# Patient Record
Sex: MALE | Race: WHITE | URBAN - METROPOLITAN AREA
[De-identification: names, ages, dates, MRNs, and addresses within clinical notes are randomized per-mention and may not be internally consistent; named-entity substitution may affect disease eponyms.]

---

## 2024-10-28 DIAGNOSIS — L40.0 PSORIASIS VULGARIS: Primary | ICD-10-CM

## 2024-10-28 RX ORDER — ROFLUMILAST 3 MG/G
1 CREAM TOPICAL DAILY
Qty: 60 G | Refills: 11 | Status: SHIPPED | OUTPATIENT
Start: 2024-10-28

## 2024-10-29 ENCOUNTER — TELEPHONE (OUTPATIENT)
Dept: DERMATOLOGY CLINIC | Facility: CLINIC | Age: 54
End: 2024-10-29

## 2024-10-29 NOTE — TELEPHONE ENCOUNTER
Key : [id number]     Zoryve cream 0.3% PA completed through [employer]. Awaiting determination.